# Patient Record
Sex: FEMALE | Race: WHITE | Employment: FULL TIME | URBAN - METROPOLITAN AREA
[De-identification: names, ages, dates, MRNs, and addresses within clinical notes are randomized per-mention and may not be internally consistent; named-entity substitution may affect disease eponyms.]

---

## 2023-06-21 ENCOUNTER — OFFICE VISIT (OUTPATIENT)
Dept: OBGYN | Age: 33
End: 2023-06-21
Payer: COMMERCIAL

## 2023-06-21 VITALS
DIASTOLIC BLOOD PRESSURE: 68 MMHG | HEART RATE: 63 BPM | WEIGHT: 242 LBS | HEIGHT: 63 IN | SYSTOLIC BLOOD PRESSURE: 109 MMHG | BODY MASS INDEX: 42.88 KG/M2

## 2023-06-21 DIAGNOSIS — Z01.419 ENCOUNTER FOR ANNUAL ROUTINE GYNECOLOGICAL EXAMINATION: Primary | ICD-10-CM

## 2023-06-21 PROCEDURE — 99203 OFFICE O/P NEW LOW 30 MIN: CPT

## 2023-06-21 PROCEDURE — 99395 PREV VISIT EST AGE 18-39: CPT | Performed by: OBSTETRICS & GYNECOLOGY

## 2023-06-21 RX ORDER — LEVOTHYROXINE SODIUM 0.15 MG/1
TABLET ORAL
COMMUNITY
Start: 2023-04-19

## 2023-06-21 RX ORDER — ESCITALOPRAM OXALATE 10 MG/1
10 TABLET ORAL NIGHTLY
COMMUNITY
Start: 2023-05-29

## 2023-06-21 RX ORDER — PANTOPRAZOLE SODIUM 40 MG/1
40 TABLET, DELAYED RELEASE ORAL DAILY
COMMUNITY
Start: 2023-05-23

## 2023-06-21 RX ORDER — ERGOCALCIFEROL 1.25 MG/1
50000 CAPSULE ORAL WEEKLY
COMMUNITY
Start: 2023-05-25

## 2023-06-21 ASSESSMENT — PATIENT HEALTH QUESTIONNAIRE - PHQ9
SUM OF ALL RESPONSES TO PHQ QUESTIONS 1-9: 0
2. FEELING DOWN, DEPRESSED OR HOPELESS: 0
SUM OF ALL RESPONSES TO PHQ QUESTIONS 1-9: 0
SUM OF ALL RESPONSES TO PHQ9 QUESTIONS 1 & 2: 0
1. LITTLE INTEREST OR PLEASURE IN DOING THINGS: 0
SUM OF ALL RESPONSES TO PHQ QUESTIONS 1-9: 0
SUM OF ALL RESPONSES TO PHQ QUESTIONS 1-9: 0

## 2023-06-21 ASSESSMENT — ENCOUNTER SYMPTOMS
CONSTIPATION: 0
ABDOMINAL PAIN: 0
SHORTNESS OF BREATH: 0
COUGH: 0
VOMITING: 0
BLOOD IN STOOL: 0
NAUSEA: 0
DIARRHEA: 0
WHEEZING: 0

## 2023-06-21 NOTE — PROGRESS NOTES
Annual was last with dr. Sean Escobar at [facility]. Thinks janis was inserted in 2019. Pt denies breast or pelvic concerns. Pt denies painful intercourse.

## 2023-06-21 NOTE — PROGRESS NOTES
Janice Tompkins   Is a 41-year-old  female who had the Mirena IUD placed in  she does not have regular menses just some occasional spotting. Pregnancies were uncomplicated. Family history paternal grandmother breast CA paternal cousin breast CA. She and her partner are sexually active with no complaints of dyspareunia. She does have a history of anxiety and depression after the death of her mother she is being treated and feels it is well controlled. She denies any self-harm or suicidal ideation. Has no past or present history of physical sexual or verbal abuse. Last Pap she believes was  not sure if it was normal she does have a history of abnormal.    Patient presents for annual exam.     Past Medical History:   Diagnosis Date    Acid reflux     Anxiety     Depression     Thyroid disease     hasimoto        Past Surgical History:   Procedure Laterality Date    APPENDECTOMY       SECTION      x2    CHOLECYSTECTOMY      RHINOPLASTY      SEPTOPLASTY      WISDOM TOOTH EXTRACTION          Family History   Problem Relation Age of Onset    Breast Cancer Maternal Grandmother     Breast Cancer Other           Current Outpatient Medications:     vitamin D (ERGOCALCIFEROL) 1.25 MG (25300 UT) CAPS capsule, Take 1 capsule by mouth once a week, Disp: , Rfl:     escitalopram (LEXAPRO) 10 MG tablet, Take 1 tablet by mouth nightly, Disp: , Rfl:     levothyroxine (SYNTHROID) 150 MCG tablet, TAKE 1 TABLET BY MOUTH MONDAY THROUGH THURSDAY AND TAKE 2 TABLETS. ..  (REFER TO PRESCRIPTION NOTES). , Disp: , Rfl:     pantoprazole (PROTONIX) 40 MG tablet, Take 1 tablet by mouth daily, Disp: , Rfl:      Allergies   Allergen Reactions    Cephalosporins     Penicillin G         Social History       Tobacco History       Smoking Status  Never      Smokeless Tobacco Use  Never              Alcohol History       Alcohol Use Status  Not Currently              Drug Use       Drug Use Status  Never

## 2023-06-24 LAB
HPV HR 12 DNA SPEC QL NAA+PROBE: NOT DETECTED
HPV SAMPLE: NORMAL
HPV16 DNA SPEC QL NAA+PROBE: NOT DETECTED
HPV16+18+H RISK 12 DNA CVX-IMP: NORMAL
HPV18 DNA SPEC QL NAA+PROBE: NOT DETECTED
SPECIMEN SOURCE: NORMAL

## 2024-06-26 ENCOUNTER — OFFICE VISIT (OUTPATIENT)
Dept: OBGYN | Age: 34
End: 2024-06-26
Payer: COMMERCIAL

## 2024-06-26 VITALS
SYSTOLIC BLOOD PRESSURE: 117 MMHG | BODY MASS INDEX: 42.97 KG/M2 | DIASTOLIC BLOOD PRESSURE: 76 MMHG | HEIGHT: 63 IN | WEIGHT: 242.5 LBS | HEART RATE: 63 BPM

## 2024-06-26 DIAGNOSIS — Z01.419 ENCOUNTER FOR ANNUAL ROUTINE GYNECOLOGICAL EXAMINATION: Primary | ICD-10-CM

## 2024-06-26 PROCEDURE — 99213 OFFICE O/P EST LOW 20 MIN: CPT | Performed by: OBSTETRICS & GYNECOLOGY

## 2024-06-26 PROCEDURE — 99395 PREV VISIT EST AGE 18-39: CPT | Performed by: OBSTETRICS & GYNECOLOGY

## 2024-06-26 RX ORDER — LEVONORGESTREL 52 MG/1
1 INTRAUTERINE DEVICE INTRAUTERINE ONCE
COMMUNITY
Start: 2020-01-01 | End: 2028-01-01

## 2024-06-26 SDOH — ECONOMIC STABILITY: FOOD INSECURITY: WITHIN THE PAST 12 MONTHS, THE FOOD YOU BOUGHT JUST DIDN'T LAST AND YOU DIDN'T HAVE MONEY TO GET MORE.: NEVER TRUE

## 2024-06-26 SDOH — ECONOMIC STABILITY: FOOD INSECURITY: WITHIN THE PAST 12 MONTHS, YOU WORRIED THAT YOUR FOOD WOULD RUN OUT BEFORE YOU GOT MONEY TO BUY MORE.: NEVER TRUE

## 2024-06-26 SDOH — ECONOMIC STABILITY: HOUSING INSECURITY
IN THE LAST 12 MONTHS, WAS THERE A TIME WHEN YOU DID NOT HAVE A STEADY PLACE TO SLEEP OR SLEPT IN A SHELTER (INCLUDING NOW)?: NO

## 2024-06-26 SDOH — ECONOMIC STABILITY: INCOME INSECURITY: HOW HARD IS IT FOR YOU TO PAY FOR THE VERY BASICS LIKE FOOD, HOUSING, MEDICAL CARE, AND HEATING?: NOT HARD AT ALL

## 2024-06-26 ASSESSMENT — PATIENT HEALTH QUESTIONNAIRE - PHQ9
2. FEELING DOWN, DEPRESSED OR HOPELESS: NOT AT ALL
SUM OF ALL RESPONSES TO PHQ QUESTIONS 1-9: 0
1. LITTLE INTEREST OR PLEASURE IN DOING THINGS: NOT AT ALL
SUM OF ALL RESPONSES TO PHQ9 QUESTIONS 1 & 2: 0
SUM OF ALL RESPONSES TO PHQ QUESTIONS 1-9: 0

## 2024-06-26 ASSESSMENT — ENCOUNTER SYMPTOMS
VOMITING: 0
NAUSEA: 0
COUGH: 0
DIARRHEA: 0
BLOOD IN STOOL: 0
ABDOMINAL PAIN: 0
SHORTNESS OF BREATH: 0
CONSTIPATION: 0
WHEEZING: 0

## 2024-06-26 NOTE — PROGRESS NOTES
Patient here for annual. last pap 6/21/23. Patient is not having periods, and has no concerns at this time.  
Tenderness: There is no abdominal tenderness.      Hernia: No hernia is present.   Lymphadenopathy:      Cervical: No cervical adenopathy.      Upper Body:      Right upper body: No supraclavicular or axillary adenopathy.      Left upper body: No supraclavicular or axillary adenopathy.   Neurological:      Mental Status: She is alert.   Skin:     General: Skin is warm.   Psychiatric:         Mood and Affect: Mood normal.   Vitals reviewed.          Niki was seen today for annual exam.    Diagnoses and all orders for this visit:    Encounter for annual routine gynecological examination    No Pap indicated today.  Mammogram recommended at age 38.  IUD should be replaced in 2028.  Can be replaced sooner if heavier bleeding starts within 5 to 6 years post insertion      Return in about 1 year (around 6/26/2025) for annual.     Hillary Orlando, DO

## 2025-07-02 ENCOUNTER — OFFICE VISIT (OUTPATIENT)
Age: 35
End: 2025-07-02
Payer: COMMERCIAL

## 2025-07-02 VITALS
HEIGHT: 63 IN | WEIGHT: 234 LBS | BODY MASS INDEX: 41.46 KG/M2 | OXYGEN SATURATION: 96 % | DIASTOLIC BLOOD PRESSURE: 82 MMHG | SYSTOLIC BLOOD PRESSURE: 130 MMHG | HEART RATE: 65 BPM | TEMPERATURE: 97.8 F

## 2025-07-02 DIAGNOSIS — Z01.419 ENCOUNTER FOR ANNUAL ROUTINE GYNECOLOGICAL EXAMINATION: Primary | ICD-10-CM

## 2025-07-02 PROCEDURE — 99395 PREV VISIT EST AGE 18-39: CPT | Performed by: OBSTETRICS & GYNECOLOGY

## 2025-07-02 SDOH — ECONOMIC STABILITY: FOOD INSECURITY: WITHIN THE PAST 12 MONTHS, THE FOOD YOU BOUGHT JUST DIDN'T LAST AND YOU DIDN'T HAVE MONEY TO GET MORE.: NEVER TRUE

## 2025-07-02 SDOH — ECONOMIC STABILITY: FOOD INSECURITY: WITHIN THE PAST 12 MONTHS, YOU WORRIED THAT YOUR FOOD WOULD RUN OUT BEFORE YOU GOT MONEY TO BUY MORE.: NEVER TRUE

## 2025-07-02 ASSESSMENT — ENCOUNTER SYMPTOMS
BLOOD IN STOOL: 0
DIARRHEA: 0
WHEEZING: 0
SHORTNESS OF BREATH: 0
COUGH: 0
NAUSEA: 0
VOMITING: 0
CONSTIPATION: 0
ABDOMINAL PAIN: 0

## 2025-07-02 ASSESSMENT — PATIENT HEALTH QUESTIONNAIRE - PHQ9
SUM OF ALL RESPONSES TO PHQ QUESTIONS 1-9: 0
1. LITTLE INTEREST OR PLEASURE IN DOING THINGS: NOT AT ALL
SUM OF ALL RESPONSES TO PHQ QUESTIONS 1-9: 0
2. FEELING DOWN, DEPRESSED OR HOPELESS: NOT AT ALL
SUM OF ALL RESPONSES TO PHQ QUESTIONS 1-9: 0
SUM OF ALL RESPONSES TO PHQ QUESTIONS 1-9: 0

## 2025-07-02 NOTE — PROGRESS NOTES
Niki cuadra is a 34-year-old G3, P3 female who had the Mirena IUD inserted in  she has rare light bleeding.  She and her partner are sexually active with no dyspareunia.  She reports no change in PMH, PSH FH or allergy history.  She has anxiety depression treated and well-controlled.  Denies self-harm or suicidal ideation.  She reports no past or present history of physical, sexual or verbal abuse  Date of last Cervical Cancer screen (HPV or PAP): 2023  neg with neg HPV      GYN History  Abn pap no  STI no  LEEP/ cryo/cone no  Uterine surgery   Ovary or tubal surgery no    Patient presents for annual exam.     Past Medical History:   Diagnosis Date    Acid reflux     Anxiety     Depression     Thyroid disease     hasimoto        Past Surgical History:   Procedure Laterality Date    APPENDECTOMY       SECTION      x2    CHOLECYSTECTOMY      RHINOPLASTY      SEPTOPLASTY      WISDOM TOOTH EXTRACTION          Family History   Problem Relation Age of Onset    Breast Cancer Maternal Grandmother     Breast Cancer Other           Current Outpatient Medications:     levonorgestrel (MIRENA, 52 MG,) IUD 52 mg, 1 each by IntraUTERine route once, Disp: , Rfl:     vitamin D (ERGOCALCIFEROL) 1.25 MG (29920 UT) CAPS capsule, Take 1 capsule by mouth once a week, Disp: , Rfl:     escitalopram (LEXAPRO) 10 MG tablet, Take 1 tablet by mouth nightly, Disp: , Rfl:     levothyroxine (SYNTHROID) 150 MCG tablet, TAKE 1 TABLET BY MOUTH MONDAY THROUGH THURSDAY AND TAKE 2 TABLETS...  (REFER TO PRESCRIPTION NOTES)., Disp: , Rfl:     pantoprazole (PROTONIX) 40 MG tablet, Take 1 tablet by mouth daily, Disp: , Rfl:      Allergies   Allergen Reactions    Cephalosporins     Penicillin G         Social History       Tobacco History       Smoking Status  Never      Smokeless Tobacco Use  Never              Alcohol History       Alcohol Use Status  Not Currently              Drug Use       Drug Use Status  Never

## 2025-07-02 NOTE — PROGRESS NOTES
Here today for annual exam  Patient has no complaints  Discharge instructions have been discussed with the patient. Patient advised to call our office with any questions or concerns.   Voiced understanding.